# Patient Record
Sex: MALE | Race: WHITE | NOT HISPANIC OR LATINO | Employment: FULL TIME | URBAN - METROPOLITAN AREA
[De-identification: names, ages, dates, MRNs, and addresses within clinical notes are randomized per-mention and may not be internally consistent; named-entity substitution may affect disease eponyms.]

---

## 2018-12-25 ENCOUNTER — HOSPITAL ENCOUNTER (EMERGENCY)
Facility: CLINIC | Age: 38
Discharge: HOME | End: 2018-12-25
Attending: EMERGENCY MEDICINE | Admitting: EMERGENCY MEDICINE
Payer: COMMERCIAL

## 2018-12-25 VITALS
DIASTOLIC BLOOD PRESSURE: 65 MMHG | OXYGEN SATURATION: 97 % | SYSTOLIC BLOOD PRESSURE: 108 MMHG | TEMPERATURE: 97.5 F | HEART RATE: 74 BPM | RESPIRATION RATE: 16 BRPM | BODY MASS INDEX: 26.47 KG/M2 | WEIGHT: 169 LBS

## 2018-12-25 DIAGNOSIS — R21 PERIANAL RASH: ICD-10-CM

## 2018-12-25 LAB
ALBUMIN SERPL-MCNC: 3.8 G/DL (ref 3.4–5)
ALBUMIN UR-MCNC: NEGATIVE MG/DL
ALP SERPL-CCNC: 98 U/L (ref 40–150)
ALT SERPL W P-5'-P-CCNC: 19 U/L (ref 0–70)
ANION GAP SERPL CALCULATED.3IONS-SCNC: 10 MMOL/L (ref 3–14)
APPEARANCE UR: CLEAR
AST SERPL W P-5'-P-CCNC: 15 U/L (ref 0–45)
BASOPHILS # BLD AUTO: 0.1 10E9/L (ref 0–0.2)
BASOPHILS NFR BLD AUTO: 1.4 %
BILIRUB SERPL-MCNC: 0.4 MG/DL (ref 0.2–1.3)
BILIRUB UR QL STRIP: NEGATIVE
BUN SERPL-MCNC: 16 MG/DL (ref 7–30)
CALCIUM SERPL-MCNC: 8.3 MG/DL (ref 8.5–10.1)
CHLORIDE SERPL-SCNC: 101 MMOL/L (ref 94–109)
CO2 SERPL-SCNC: 27 MMOL/L (ref 20–32)
COLOR UR AUTO: YELLOW
CREAT SERPL-MCNC: 1 MG/DL (ref 0.66–1.25)
CRP SERPL-MCNC: 3.9 MG/L (ref 0–8)
DIFFERENTIAL METHOD BLD: ABNORMAL
EOSINOPHIL NFR BLD AUTO: 2.9 %
ERYTHROCYTE [DISTWIDTH] IN BLOOD BY AUTOMATED COUNT: 14.2 % (ref 10–15)
ERYTHROCYTE [SEDIMENTATION RATE] IN BLOOD BY WESTERGREN METHOD: 14 MM/H (ref 0–15)
GFR SERPL CREATININE-BSD FRML MDRD: >90 ML/MIN/{1.73_M2}
GLUCOSE SERPL-MCNC: 271 MG/DL (ref 70–99)
GLUCOSE UR STRIP-MCNC: >499 MG/DL
HCT VFR BLD AUTO: 39.8 % (ref 40–53)
HGB BLD-MCNC: 13.2 G/DL (ref 13.3–17.7)
HGB UR QL STRIP: ABNORMAL
IMM GRANULOCYTES # BLD: 0 10E9/L (ref 0–0.4)
IMM GRANULOCYTES NFR BLD: 0.2 %
KETONES UR STRIP-MCNC: NEGATIVE MG/DL
LEUKOCYTE ESTERASE UR QL STRIP: NEGATIVE
LYMPHOCYTES # BLD AUTO: 1.4 10E9/L (ref 0.8–5.3)
LYMPHOCYTES NFR BLD AUTO: 17.4 %
MCH RBC QN AUTO: 28.4 PG (ref 26.5–33)
MCHC RBC AUTO-ENTMCNC: 33.2 G/DL (ref 31.5–36.5)
MCV RBC AUTO: 86 FL (ref 78–100)
MONOCYTES # BLD AUTO: 1.1 10E9/L (ref 0–1.3)
MONOCYTES NFR BLD AUTO: 14.2 %
MUCOUS THREADS #/AREA URNS LPF: PRESENT /LPF
NEUTROPHILS # BLD AUTO: 5.1 10E9/L (ref 1.6–8.3)
NEUTROPHILS NFR BLD AUTO: 63.9 %
NITRATE UR QL: NEGATIVE
NRBC # BLD AUTO: 0 10*3/UL
NRBC BLD AUTO-RTO: 0 /100
PH UR STRIP: 5 PH (ref 5–7)
PLATELET # BLD AUTO: 285 10E9/L (ref 150–450)
POTASSIUM SERPL-SCNC: 4.6 MMOL/L (ref 3.4–5.3)
PROT SERPL-MCNC: 7.6 G/DL (ref 6.8–8.8)
RBC # BLD AUTO: 4.64 10E12/L (ref 4.4–5.9)
RBC #/AREA URNS AUTO: 7 /HPF (ref 0–2)
SODIUM SERPL-SCNC: 138 MMOL/L (ref 133–144)
SOURCE: ABNORMAL
SP GR UR STRIP: 1.01 (ref 1–1.03)
SPERM #/AREA URNS HPF: PRESENT /HPF
UROBILINOGEN UR STRIP-MCNC: 0 MG/DL (ref 0–2)
WBC # BLD AUTO: 8 10E9/L (ref 4–11)
WBC #/AREA URNS AUTO: 3 /HPF (ref 0–5)

## 2018-12-25 PROCEDURE — 25000128 H RX IP 250 OP 636: Performed by: EMERGENCY MEDICINE

## 2018-12-25 PROCEDURE — 85025 COMPLETE CBC W/AUTO DIFF WBC: CPT | Performed by: EMERGENCY MEDICINE

## 2018-12-25 PROCEDURE — 87077 CULTURE AEROBIC IDENTIFY: CPT | Performed by: EMERGENCY MEDICINE

## 2018-12-25 PROCEDURE — 99284 EMERGENCY DEPT VISIT MOD MDM: CPT | Mod: 25

## 2018-12-25 PROCEDURE — 96374 THER/PROPH/DIAG INJ IV PUSH: CPT

## 2018-12-25 PROCEDURE — 86140 C-REACTIVE PROTEIN: CPT | Performed by: EMERGENCY MEDICINE

## 2018-12-25 PROCEDURE — 87070 CULTURE OTHR SPECIMN AEROBIC: CPT | Performed by: EMERGENCY MEDICINE

## 2018-12-25 PROCEDURE — 85652 RBC SED RATE AUTOMATED: CPT | Performed by: EMERGENCY MEDICINE

## 2018-12-25 PROCEDURE — 87102 FUNGUS ISOLATION CULTURE: CPT | Performed by: EMERGENCY MEDICINE

## 2018-12-25 PROCEDURE — 87186 SC STD MICRODIL/AGAR DIL: CPT | Performed by: EMERGENCY MEDICINE

## 2018-12-25 PROCEDURE — 99283 EMERGENCY DEPT VISIT LOW MDM: CPT | Mod: Z6 | Performed by: EMERGENCY MEDICINE

## 2018-12-25 PROCEDURE — 80053 COMPREHEN METABOLIC PANEL: CPT | Performed by: EMERGENCY MEDICINE

## 2018-12-25 PROCEDURE — 25000125 ZZHC RX 250: Performed by: EMERGENCY MEDICINE

## 2018-12-25 PROCEDURE — 81001 URINALYSIS AUTO W/SCOPE: CPT | Performed by: EMERGENCY MEDICINE

## 2018-12-25 RX ORDER — DOXYCYCLINE 100 MG/1
100 CAPSULE ORAL 2 TIMES DAILY
Qty: 28 CAPSULE | Refills: 0 | Status: SHIPPED | OUTPATIENT
Start: 2018-12-25 | End: 2018-12-25

## 2018-12-25 RX ORDER — DOXYCYCLINE 100 MG/1
100 CAPSULE ORAL 2 TIMES DAILY
Qty: 14 CAPSULE | Refills: 0 | Status: SHIPPED | OUTPATIENT
Start: 2018-12-25 | End: 2019-01-01

## 2018-12-25 RX ORDER — MUPIROCIN 20 MG/G
OINTMENT TOPICAL 2 TIMES DAILY
Qty: 30 G | Refills: 0 | Status: SHIPPED | OUTPATIENT
Start: 2018-12-25 | End: 2019-01-08

## 2018-12-25 RX ORDER — HYDROMORPHONE HYDROCHLORIDE 1 MG/ML
0.5 INJECTION, SOLUTION INTRAMUSCULAR; INTRAVENOUS; SUBCUTANEOUS
Status: DISCONTINUED | OUTPATIENT
Start: 2018-12-25 | End: 2018-12-25 | Stop reason: HOSPADM

## 2018-12-25 RX ORDER — MUPIROCIN 20 MG/G
OINTMENT TOPICAL ONCE
Status: DISCONTINUED | OUTPATIENT
Start: 2018-12-25 | End: 2018-12-25 | Stop reason: HOSPADM

## 2018-12-25 RX ORDER — CLARITHROMYCIN 250 MG/1
1000 TABLET, FILM COATED ORAL ONCE
Status: DISCONTINUED | OUTPATIENT
Start: 2018-12-25 | End: 2018-12-25

## 2018-12-25 RX ADMIN — LIDOCAINE HYDROCHLORIDE 10 ML: 20 JELLY TOPICAL at 20:06

## 2018-12-25 RX ADMIN — Medication 0.5 MG: at 19:57

## 2018-12-25 NOTE — ED AVS SNAPSHOT
[facility] Emergency Department  [address] [place] [address]  Phone:  [phone]  Fax:  [phone]                                    Hung Fernandes   MRN: [id number]    Department:  [facility] Emergency Department   Date of Visit:  12/25/2018           After Visit Summary Signature Page    I have received my discharge instructions, and my questions have been answered. I have discussed any challenges I see with this plan with the nurse or doctor.    ..........................................................................................................................................  Patient/Patient Representative Signature      ..........................................................................................................................................  Patient Representative Print Name and Relationship to Patient    ..................................................               ................................................  Date                                   Time    ..........................................................................................................................................  Reviewed by Signature/Title    ...................................................              ..............................................  Date                                               Time          22EPIC Rev 08/18

## 2018-12-25 NOTE — LETTER
December 25, 2018      To Whom It May Concern:      Hung Fernandes was seen in our Emergency Department today, 12/25/18.  I expect his condition to improve over the next 2-3 days.  He may return to work/school when improved.    Sincerely,        Belkis Gannon MD

## 2018-12-26 NOTE — RESULT ENCOUNTER NOTE
[place] ED discharge antibiotic (if prescribed):  Doxycycline 100 mg PO tablet, 1 tablet (100 mg) by mouth 2 times daily for 7 days AND Bactroban oint  Incision and Drainage performed in [place] ED [Yes / No] : No  No changes in treatment per ED lab result protocol.

## 2018-12-26 NOTE — ED TRIAGE NOTES
"Here with rash to groin area. States he was seen in [place] in the past week and treated foe a yeast infection. \"What ever they gave me-it's not working\".  "

## 2018-12-26 NOTE — ED PROVIDER NOTES
"  History     Chief Complaint   Patient presents with     Rash     HPI  History per patient and medical records    This is a 38-year-old male with history of type 1 diabetes since age 6 presenting with rash.  Patient was at work 6 days ago when he had the sensation of some fluid leaking from his rectum.  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  After returning home, he noted that there was some clear wet fluid in his underwear and then started noting that he had pain in the rectal area \"like acid\".  He continued to have increasing pain and occasional fluid leaking from the rectum and eventually was seen in an urgent care Saturday, 3 days ago.  He was diagnosed with likely yeast infection and given some triamcinolone/nystatin cream and fluconazole orally.  Since that time, patient has continued to have symptoms that have worsened.  He notes that it hurts to sit, he has severe pain when trying to have a bowel movement.  He has had decreased oral intake because he does not want to try to pass stool.  He said for urinary frequency.  He denies any rectal bleeding.  No fevers or chills.  He is just felt generally unwell.  He was on antibiotics at the end of August for prostatitis for 6 weeks.  He notes that his rectal pain then was different.  It completely resolved after the antibiotics.  He has been trying some ibuprofen for pain without relief.  His blood sugars run high in the 200s and his A1c was 9 on last check.  Patient has history of some sort of breathing difficulty with low oxygen saturations.  He states he had a \"lump\" noted in his lung and had an attempted biopsy.  They were unable to biopsy the lump itself so he had a lymph node biopsied that did not show any abnormalities.  Patient's wife is concerned that he might have some kind of atypical infection such as blastomycosis because she researched this and noted it was associated with lung issues, skin issues, prostatitis.  " Patient has not had any travel history except to the [place].  No other skin lesions or complaints.    Problem List:    Patient Active Problem List    Diagnosis Date Noted     Type 1 diabetes mellitus (H)      Priority: Medium     Diabetes mellitus  Problem list name updated by automated process. Provider to review       Hypothyroid      Priority: Medium        Past Medical History:    Past Medical History:   Diagnosis Date     Closed fracture of unspecified part of tibia 04/2002     Hypothyroid      Proteinuria 03/2003     Type I (juvenile type) diabetes mellitus without mention of complication, not stated as uncontrolled        Past Surgical History:    Past Surgical History:   Procedure Laterality Date     C NONSPECIFIC PROCEDURE  04/02    right leg surgery melva placement     SURGICAL HISTORY OF -   07/02    Surgical revisions right leg     SURGICAL HISTORY OF -   10/02    Bone grafting - right leg       Family History:    Family History   Problem Relation Age of Onset     Alcohol/Drug Father         Alcoholism       Social History:  Marital Status:   [2]  Social History     Tobacco Use     Smoking status: Former Smoker     Packs/day: 1.00     Types: Cigarettes     Smokeless tobacco: Never Used   Substance Use Topics     Alcohol use: Yes     Comment: rare     Drug use: No        Medications:      doxycycline hyclate (VIBRAMYCIN) 100 MG capsule   lidocaine (XYLOCAINE) 2 % external gel   mupirocin (BACTROBAN) 2 % external ointment   INSULIN REGULAR HUMAN 100 UNIT/ML IJ SOLN   LANTUS 100 UNIT/ML SC SOLN   levothyroxine (SYNTHROID, LEVOTHROID) 75 MCG tablet         Review of Systems   All other ROS reviewed and are negative or non-contributory except as stated in HPI.     Physical Exam   BP: 116/59  Pulse: 74  Heart Rate: 89  Temp: 97.5  F (36.4  C)  Resp: 16  Weight: 76.7 kg (169 lb)  SpO2: 98 %      Physical Exam   Constitutional: He appears well-developed and well-nourished.   Small, thin,  uncomfortable appearing male standing in the room   HENT:   Head: Normocephalic.   Nose: Nose normal.   No intraoral lesions, no thrush   Eyes: Conjunctivae and EOM are normal. Pupils are equal, round, and reactive to light.   Neck: Normal range of motion. Neck supple.   Cardiovascular: Normal rate, regular rhythm and intact distal pulses.   Pulmonary/Chest: Effort normal.   Genitourinary:         Musculoskeletal: Normal range of motion. He exhibits no edema.   Neurological: He is alert. He exhibits normal muscle tone.   Skin: Skin is warm and dry. He is not diaphoretic.   Psychiatric: His behavior is normal.   Mildly anxious   Vitals reviewed.      ED Course (with Medical Decision Making)    Pt seen and examined by me.  RN and EPIC notes reviewed.      Patient with rash starting in his gluteal cleft and extending down to his perineum surrounding the rectum.  He apparently has had some clear fluid from his rectum.  He has tried an antifungal and anti-inflammatory cream with seemingly worsening symptoms.  I am unsure of what to make of the fluid from his rectum.  We talked about any sort of contact dermatitis causes such as new soaps, lotions, creams, washing detergents, or other exposures, new medications.  He denies knowing of any obvious cause.  I do not see any other lesions or satellite lesions.  My initial thought was a possible strep infection.  Posterior pharynx is normal.  I am mostly worried this may have some underlying bacterial cause since he did not respond at all to the topical antifungals and steroids but rather got worse.  I did use some lidocaine jelly which helps with the external pain.  First I also did a swab of some of the oozing area to send for bacterial culture and fungal culture.  IV was placed for pain medications, check labs.  We also checked his urine.    White count is normal.  Chemistry panel shows high glucose at 271.  ESR and CRP are normal.  He is dumping a lot of sugar in his  urine.    I am going to try some topical Bactroban over the area.  I also given some lidocaine jelly for home.  There is a chance this could be erythrasma.  The treatment for that is usually clarithromycin or erythromycin, but these are not available.  I did see evidence that doxycycline can help also.  I am going to give him an Rx for this for home.  This should hopefully also cover any other skin bacteria.  I would like him to make an appointment to see a primary care provider for follow-up as soon as possible.  If he is not improving however he can return to the ED promptly at any time.  We also discussed at length his need to keep his blood sugars in better control.        Procedures    Results for orders placed or performed during the hospital encounter of 12/25/18 (from the past 24 hour(s))   CBC with platelets differential   Result Value Ref Range    WBC 8.0 4.0 - 11.0 10e9/L    RBC Count 4.64 4.4 - 5.9 10e12/L    Hemoglobin 13.2 (L) 13.3 - 17.7 g/dL    Hematocrit 39.8 (L) 40.0 - 53.0 %    MCV 86 78 - 100 fl    MCH 28.4 26.5 - 33.0 pg    MCHC 33.2 31.5 - 36.5 g/dL    RDW 14.2 10.0 - 15.0 %    Platelet Count 285 150 - 450 10e9/L    Diff Method Automated Method     % Neutrophils 63.9 %    % Lymphocytes 17.4 %    % Monocytes 14.2 %    % Eosinophils 2.9 %    % Basophils 1.4 %    % Immature Granulocytes 0.2 %    Nucleated RBCs 0 0 /100    Absolute Neutrophil 5.1 1.6 - 8.3 10e9/L    Absolute Lymphocytes 1.4 0.8 - 5.3 10e9/L    Absolute Monocytes 1.1 0.0 - 1.3 10e9/L    Absolute Basophils 0.1 0.0 - 0.2 10e9/L    Abs Immature Granulocytes 0.0 0 - 0.4 10e9/L    Absolute Nucleated RBC 0.0    Comprehensive metabolic panel   Result Value Ref Range    Sodium 138 133 - 144 mmol/L    Potassium 4.6 3.4 - 5.3 mmol/L    Chloride 101 94 - 109 mmol/L    Carbon Dioxide 27 20 - 32 mmol/L    Anion Gap 10 3 - 14 mmol/L    Glucose 271 (H) 70 - 99 mg/dL    Urea Nitrogen 16 7 - 30 mg/dL    Creatinine 1.00 0.66 - 1.25 mg/dL    GFR  Estimate >90 >60 mL/min/[1.73_m2]    GFR Estimate If Black >90 >60 mL/min/[1.73_m2]    Calcium 8.3 (L) 8.5 - 10.1 mg/dL    Bilirubin Total 0.4 0.2 - 1.3 mg/dL    Albumin 3.8 3.4 - 5.0 g/dL    Protein Total 7.6 6.8 - 8.8 g/dL    Alkaline Phosphatase 98 40 - 150 U/L    ALT 19 0 - 70 U/L    AST 15 0 - 45 U/L   CRP inflammation   Result Value Ref Range    CRP Inflammation 3.9 0.0 - 8.0 mg/L   Erythrocyte sedimentation rate auto   Result Value Ref Range    Sed Rate 14 0 - 15 mm/h   UA with Microscopic   Result Value Ref Range    Color Urine Yellow     Appearance Urine Clear     Glucose Urine >499 (A) NEG^Negative mg/dL    Bilirubin Urine Negative NEG^Negative    Ketones Urine Negative NEG^Negative mg/dL    Specific Gravity Urine 1.014 1.003 - 1.035    Blood Urine Moderate (A) NEG^Negative    pH Urine 5.0 5.0 - 7.0 pH    Protein Albumin Urine Negative NEG^Negative mg/dL    Urobilinogen mg/dL 0.0 0.0 - 2.0 mg/dL    Nitrite Urine Negative NEG^Negative    Leukocyte Esterase Urine Negative NEG^Negative    Source Midstream Urine     WBC Urine 3 0 - 5 /HPF    RBC Urine 7 (H) 0 - 2 /HPF    Mucous Urine Present (A) NEG^Negative /LPF    sperm Present (A) NEG^Negative /HPF       Medications   lidocaine 2 % (URO-JET) jelly 10 mL (10 mLs Topical Given 12/25/18 2006)       Assessments & Plan      I have reviewed the findings, diagnosis, plan and need for follow up with the patient.       Medication List      Started    doxycycline hyclate 100 MG capsule  Commonly known as:  VIBRAMYCIN  100 mg, Oral, 2 TIMES DAILY     lidocaine 2 % external gel  Commonly known as:  XYLOCAINE  Topical, PRN     mupirocin 2 % external ointment  Commonly known as:  BACTROBAN  Topical, 2 TIMES DAILY            Final diagnoses:   Perianal rash     Disposition: Patient discharged home in stable condition.  Plan as above.  Return for concerns.     Note: Chart documentation done in part with Dragon Voice Recognition software. Although reviewed after completion,  some word and grammatical errors may remain.   12/25/2018   [facility] EMERGENCY DEPARTMENT     Belkis Gannon MD  12/26/18 005

## 2018-12-26 NOTE — DISCHARGE INSTRUCTIONS
Try a sitz bath as needed for discomfort.    Use Tucks pads or sensitive wipes as needed for cleaning.    Apply lidocaine jelly as needed for pain.    Antibiotic ointment twice daily to red area.    Antibiotics as prescribed.    Follow-up in clinic for recheck later this week.    Return at any time for concerns.    I hope this improves very quickly!!    Neela Parmar!!

## 2018-12-28 LAB
BACTERIA SPEC CULT: ABNORMAL
BACTERIA SPEC CULT: ABNORMAL
SPECIMEN SOURCE: ABNORMAL

## 2018-12-28 NOTE — RESULT ENCOUNTER NOTE
Await final culture report per [facility] ED Lab Result protocol.  RN confirmed Patient was prescribed antibiotic from ED visit.

## 2018-12-29 ENCOUNTER — TELEPHONE (OUTPATIENT)
Dept: EMERGENCY MEDICINE | Facility: CLINIC | Age: 38
End: 2018-12-29

## 2018-12-29 NOTE — TELEPHONE ENCOUNTER
"[facility] Emergency Department Lab result notification:    [facility] ED lab result protocol used  General culture    Reason for call  Notify of lab results, assess symptoms,  review ED providers recommendations/discharge instructions (if necessary) and advise per ED lab result f/u protocol    Lab Result  Final Wound culture (perianal rash) report on 12/28/18  Emergency Dept discharge antibiotic prescribed: Doxycycline 100 mg PO tablet, 1 tablet (100 mg) by mouth 2 times daily for 7 days AND Bactroban oint  #1. Bacteria, Heavy growth Streptococcus pyrogenes sero group A, which is [NOT TESTED] to antibiotic   #2. Bacteria, Heavy  growth Staphylococcus aureus, which is [RESISTANT] to antibiotic   Incision and Drainage performed in [facility] ED [Yes / No]: No  Patient to be notified of result, symptoms assessed and advised per [facility] ED lab result protocol.    Information table from ED Provider visit on 12/25/18  Symptoms reported at ED visit (Chief complaint, HPI) Rash      HPI  History per patient and medical records     This is a 38-year-old male with history of type 1 diabetes since age 6 presenting with rash.  Patient was at work 6 days ago when he had the sensation of some fluid leaking from his rectum.  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  After returning home, he noted that there was some clear wet fluid in his underwear and then started noting that he had pain in the rectal area \"like acid\".  He continued to have increasing pain and occasional fluid leaking from the rectum and eventually was seen in an urgent care Saturday, 3 days ago.  He was diagnosed with likely yeast infection and given some triamcinolone/nystatin cream and fluconazole orally.  Since that time, patient has continued to have symptoms that have worsened.  He notes that it hurts to sit, he has severe pain when trying to have a bowel movement.  He has had decreased oral intake because he " "does not want to try to pass stool.  He said for urinary frequency.  He denies any rectal bleeding.  No fevers or chills.  He is just felt generally unwell.  He was on antibiotics at the end of August for prostatitis for 6 weeks.  He notes that his rectal pain then was different.  It completely resolved after the antibiotics.  He has been trying some ibuprofen for pain without relief.  His blood sugars run high in the 200s and his A1c was 9 on last check.  Patient has history of some sort of breathing difficulty with low oxygen saturations.  He states he had a \"lump\" noted in his lung and had an attempted biopsy.  They were unable to biopsy the lump itself so he had a lymph node biopsied that did not show any abnormalities.  Patient's wife is concerned that he might have some kind of atypical infection such as blastomycosis because she researched this and noted it was associated with lung issues, skin issues, prostatitis.  Patient has not had any travel history except to the [facility].  No other skin lesions or complaints.     ED providers Impression and Plan (applicable information)    Patient with rash starting in his gluteal cleft and extending down to his perineum surrounding the rectum.  He apparently has had some clear fluid from his rectum.  He has tried an antifungal and anti-inflammatory cream with seemingly worsening symptoms.  I am unsure of what to make of the fluid from his rectum.  We talked about any sort of contact dermatitis causes such as new soaps, lotions, creams, washing detergents, or other exposures, new medications.  He denies knowing of any obvious cause.  I do not see any other lesions or satellite lesions.  My initial thought was a possible strep infection.  Posterior pharynx is normal.  I am mostly worried this may have some underlying bacterial cause since he did not respond at all to the topical antifungals and steroids but rather got worse.  I did use some lidocaine jelly which " "helps with the external pain.  First I also did a swab of some of the oozing area to send for bacterial culture and fungal culture.  IV was placed for pain medications, check labs.  We also checked his urine.     White count is normal.  Chemistry panel shows high glucose at 271.  ESR and CRP are normal.  He is dumping a lot of sugar in his urine.     I am going to try some topical Bactroban over the area.  I also given some lidocaine jelly for home.  There is a chance this could be erythrasma.  The treatment for that is usually clarithromycin or erythromycin, but these are not available.  I did see evidence that doxycycline can help also.  I am going to give him an Rx for this for home.  This should hopefully also cover any other skin bacteria.  I would like him to make an appointment to see a primary care provider for follow-up as soon as possible.  If he is not improving however he can return to the ED promptly at any time.  We also discussed at length his need to keep his blood sugars in better control.   Miscellaneous information N/A     RN Assessment (Patient s current Symptoms), include time called.  [Insert Left message here if message left]  4:47PM: Spoke with Patient. States he is doing better. \"I could hardly walk before it hurt so much.\" Is able to ambulate without difficulty now. States the rectal area discomfort is greatly improved. Rash surrounding the rectal area is \"just a tiny bit\" there presently, \"it was extremely red before.\" Denies fever, chills. Is eating and drinking well.   RN Recommendations/Instructions per [place] ED lab result protocol  Patient notified of lab result. Advised to continue taking the prescribed antibiotic. Has not made a follow up appointment with his PCP, advised to see PCP upon completion of the antibiotic. Reviewed signs of worsening symptoms. Patient has no further concerns at this time. Yeast culture is still pending, Patient would like to be notified of this result " when finalized.     Please Contact your PCP clinic or return to the Emergency department if your:    Symptoms worsen or other concerning symptom's.    PCP follow-up Questions asked: YES       [RN Name]  Sylvia Garduno RN  [facility] RN  Lung Nodule and ED Lab Result RN  Epic pool (ED late result f/u RN): [id number]  [facility] INCIDENTAL RADIOLOGY F/U NURSES: [id number]  # [phone]      Copy of Lab result   Skin Culture Aerobic Bacterial [[id number]] (Order [id number])   Exam Information     Exam Date Exam Time Accession # Results    12/25/18  8:02 PM [id number]    Component Results     Specimen Information: Perirectal; Skin        Component Collected Lab   Specimen Description 12/25/2018  8:02    Skin Perirectal    Culture Micro (Abnormal) 12/25/2018  8:02    Abnormal   Heavy growth   Streptococcus pyogenes sero group A   Susceptibility testing not routinely done     Culture Micro (Abnormal) 12/25/2018  8:02    Abnormal   Heavy growth   Staphylococcus aureus     Susceptibility     Staphylococcus aureus (2)     Antibiotic Interpretation Sensitivity Method Status   CLINDAMYCIN Sensitive <=0.25 ug/mL ELVIRA Final   ERYTHROMYCIN Sensitive <=0.25 ug/mL ELVIRA Final   GENTAMICIN Sensitive <=0.5 ug/mL ELVIRA Final   OXACILLIN Sensitive 0.5 ug/mL ELVIRA Final   PENICILLIN Resistant >=0.5 ug/mL ELVIRA Final   TETRACYCLINE Resistant >=16 ug/mL ELVIRA Final   Trimethoprim/Sulfa Sensitive <=0.5/9.5 ug/mL ELVIRA Final   VANCOMYCIN Sensitive 1 ug/mL ELVIRA Final

## 2018-12-31 LAB
SPECIMEN SOURCE: NORMAL
YEAST SPEC QL CULT: NORMAL

## 2018-12-31 NOTE — TELEPHONE ENCOUNTER
"Notified of final yeast culture showing \"No growth\".  Reports symptoms continue to improve, \"not painful anymore and is not feeling hot.\"  He will be trying to f/u with his PCP this week.    Scott Mahoney RN  [facility] RN  Lung Nodule and ED Lab Result RN  Epic pool (ED late result f/u RN): [id number]  [facility] INCIDENTAL RADIOLOGY F/U NURSES: [id number]  # [phone]    "

## 2019-11-04 ENCOUNTER — HEALTH MAINTENANCE LETTER (OUTPATIENT)
Age: 39
End: 2019-11-04

## 2020-03-15 ENCOUNTER — VIRTUAL VISIT (OUTPATIENT)
Dept: FAMILY MEDICINE | Facility: OTHER | Age: 40
End: 2020-03-15

## 2020-03-21 NOTE — PROGRESS NOTES
"Date: 03/15/2020 16:21:56  Clinician: Dr. Keiko Becker, BRIANNA, SAM  Clinician NPI: [id number]  Patient: Hung Fernandes  Patient : 1980  Patient Address: [address] [place], [place] [address]  Patient Phone: [phone]  Visit Protocol: URI  Patient Summary:  Hung is a 40 year old ( : 1980 ) male who initiated a Visit for COVID-19 (Coronavirus) evaluation and screening. When asked the question \"Please sign me up to receive news, health information and promotions. \", Hung responded \"No\".    Hung states his symptoms started suddenly 7-9 days ago.   His symptoms consist of malaise, chills, wheezing, and a cough. Hung also feels feverish.   Symptom details     Cough: Hung coughs almost every minute and his cough is more bothersome at night. Phlegm does not come into his throat when he coughs. He does not believe his cough is caused by post-nasal drip.     Temperature: His current temperature is 98.3 degrees Fahrenheit.     Wheezing: Hung has been diagnosed with asthma. The wheezing interferes with his normal daily activities.     Hung denies having ear pain, headache, rhinitis, facial pain or pressure, myalgias, sore throat, nasal congestion, and teeth pain. He also denies having recent facial or sinus surgery in the past 60 days, double sickening (worsening symptoms after initial improvement), and taking antibiotic medication for the symptoms.   Precipitating events  He has not recently been exposed to someone with influenza. Hung has not been in close contact with any high risk individuals.   Pertinent COVID-19 (Coronavirus) information  Hung has not traveled internationally or to the areas where COVID-19 (Coronavirus) is widespread in the last 14 days before the start of his symptoms.   Hung has not had close contact with a suspected or laboratory-confirmed COVID-19 patient within 14 days of symptom onset.   Hung is not a healthcare worker and does not work in a healthcare " facility.   Triage Point(s) temporarily suspended for COVID-19 (Coronavirus) screening  Hung reported the following symptoms which were previously protocol referral points. These protocol referral points have temporarily been removed for purposes of COVID-19 (Coronavirus) screening.     Difficulty breathing even when resting and can only speak in phrase(s)    Wheezing that keeps Hung from doing daily activities     Pertinent medical history  Hung does not need a return to work/school note.   Weight: 165 lbs   Hung does not smoke or use smokeless tobacco.   Additional information as reported by the patient (free text): Type 1 diabetic   Weight: 165 lbs    MEDICATIONS: Novolog Flexpen U-100 Insulin aspart subcutaneous, insulin glargine subcutaneous, ALLERGIES: NKDA  Clinician Response:  Dear Hung,  Based on the information provided, you have an influenza-like illness. This is an infection that has the same symptoms of the flu, but the specific virus is not known. Lab testing would be required to confirm the flu virus, but this is often not necessary because the treatment will be the same no matter what is causing your symptoms.  Your symptoms should improve gradually over the next week.  Medication information  I am prescribing:       Benzonatate (Tessalon Perles) 100 mg oral capsule. Take 1-2 capsules by mouth 3 times per day as needed for your cough. There are no refills with this prescription.      Ventolin HFA 90 mcg/actuation aerosol inhaler. Inhale 2 puffs every 4-6 hours as needed for 5 days. There are no refills with this prescription.     The CDC recommends treatment for flu only if antiviral medications can be started within 48 hours of the first flu symptoms or if you fall into one of the high-risk groups that are more likely to get flu complications.  Since you do not meet guidelines for treatment with antiviral medications, antiviral treatment is not recommended for you. Treatment focuses on  controlling your symptoms.  Unless you are allergic to the over-the-counter medication(s) below, I recommend using:       Acetaminophen (Tylenol or store brand) oral tablet. Take 1-2 tablets by mouth every 4-6 hours to help with the discomfort.      Ibuprofen (Advil or store brand) 200 mg oral tablet. Take 1-3 tablets (200-600 mg) by mouth every 8 hours to help with the discomfort. Make sure to take the ibuprofen with food. Do not exceed 2400 mg in 24 hours.     Over-the-counter medications do not require a prescription. Ask the pharmacist if you have any questions.  Self care  The following tips will keep you as comfortable as possible while you recover:     Rest    Drink plenty of water and other liquids    Take a spoonful of honey to reduce your cough     If you have a fever, stay home until your temperature has returned to normal for 24 hours and you feel well enough for daily activities. And of course, wash your hands often to prevent spreading the flu and other illnesses. However, the best way to prevent the flu is to get a flu shot before each flu season.  When to seek care  Please be seen in a clinic or urgent care if new symptoms develop, or symptoms become worse.  COVID-19 (Coronavirus) General Information  With the increase in the number of COVID-19 (Coronavirus) cases, we understand you may have some questions. Below is some helpful information on COVID-19 (Coronavirus).  How can I protect myself and others from the COVID-19 (Coronavirus)?  Because there is currently no vaccine to prevent infection, the best way to protect yourself is to avoid being exposed to this virus. Put distance between yourself and other people if COVID-19 (Coronavirus) is spreading in your community. The virus is thought to spread mainly from person-to-person.     Between people who are in close contact with one another (within about 6 about) for prolonged period (10 minutes or longer).    Through respiratory droplets produced  when an infected person coughs or sneezes.     The CDC recommends the following additional steps to protect yourself and others:     Wash your hands often with soap and water for at least 20 seconds, especially after blowing your nose, coughing, or sneezing; going to the bathroom; and before eating or preparing food.  Use an alcohol-based hand  that contains at least 60 percent alcohol if soap and water are not available.        Avoid touching your eyes, nose and mouth with unwashed hands.    Avoid close contact with people who are sick.    Stay home when you are sick.    Cover your cough or sneeze with a tissue, then throw the tissue in the trash.    Clean and disinfect frequently touched objects and surfaces.     You can help stop COVID-19 (Coronavirus) by knowing the signs and symptoms:     Fever    Cough    Shortness of breath     Contact your healthcare provider if   Develop symptoms   AND   Have been in close contact with a person known to have COVID-19 (Coronavirus) or live in or have recently traveled from an area with ongoing spread of COVID-19 (Coronavirus). Call ahead before you go to a doctor's office or emergency room. Tell them about your recent travel and your symptoms.   For the most up to date information, visit the CDC's website.  Steps to help prevent the spread of COVID-19 (Coronavirus) if you are sick  If you are sick with COVID-19 (Coronavirus) or suspect you are infected with the virus that causes COVID-19 (Coronavirus), follow the steps below to help prevent the disease from spreading&nbsp;to people in your home and community.     Stay home except to get medical care. Home isolation may be started in consultation with your healthcare clinician.    Separate yourself from other people and animals in your home.    Call ahead before visiting your doctor if you have a medical appointment.    Wear a facemask when you are around other people.    Cover your cough and sneezes.    Clean your  "hands often.    Avoid sharing personal household items.    Clean and disinfect frequently touched objects and surfaces everyday.    You will need to have someone drop off medications or household supplies (if needed) at your house without coming inside or in contact with you or others living in your house.    Monitor your symptoms and seek prompt medical care if your illness is worsening (e.g. Difficulty breathing).    Discontinue home isolation only in consultation with your healthcare provider.     For more detailed and up to date information on what to do if you are sick, visit this link: What to Do If You Are Sick With Coronavirus Disease 2019 (COVID-19).  Do I need to be tested for COVID-19 (Coronavirus)?     At this time, the limited number of tests available are controlled by the state and local health departments and are being reserved for more seriously ill patients, those with known exposure to confirmed patients, and those with recent travel (within 14 days) to countries with high rates of COVID-19 (Coronavirus).    Decisions on which patients receive testing will be based on the local spread of COVID-19 (Coronavirus) as well as the symptoms. Your healthcare provider will make the final decision on whether you should be tested.    In the meantime, if you have concerns that you may have been exposed, it is reasonable to practice \"social distancing.\"&nbsp; If you are ill with a cold or flu-like illness, please monitor your symptoms and reach out to your healthcare provider if your symptoms worsen.    For more up to date information, visit this link: COVID-19 (Coronavirus) Frequently Asked Questions and Answers.      Diagnosis: Influenza-like illness  Diagnosis ICD: J11.1  Prescription: Ventolin HFA 90 mcg/actuation inhalation HFA aerosol inhaler 1 200 inhalation canister, 5 days supply. Inhale 2 puffs every 4-6 hours as needed for 5 days. Refills: 0, Refill as needed: no, Allow substitutions: " yes  Prescription: benzonatate (Tessalon Perles) 100 mg oral capsule 30 capsule, 5 days supply. Take 1-2 capsules by mouth 3 times per day as needed. Refills: 0, Refill as needed: no, Allow substitutions: yes  Pharmacy: [id number] - [phone] - [address] [phone]

## 2020-11-16 ENCOUNTER — HEALTH MAINTENANCE LETTER (OUTPATIENT)
Age: 40
End: 2020-11-16

## 2021-05-29 ENCOUNTER — HEALTH MAINTENANCE LETTER (OUTPATIENT)
Age: 41
End: 2021-05-29

## 2021-09-18 ENCOUNTER — HEALTH MAINTENANCE LETTER (OUTPATIENT)
Age: 41
End: 2021-09-18

## 2022-01-08 ENCOUNTER — HEALTH MAINTENANCE LETTER (OUTPATIENT)
Age: 42
End: 2022-01-08

## 2022-08-20 ENCOUNTER — HEALTH MAINTENANCE LETTER (OUTPATIENT)
Age: 42
End: 2022-08-20

## 2022-11-20 ENCOUNTER — HEALTH MAINTENANCE LETTER (OUTPATIENT)
Age: 42
End: 2022-11-20

## 2023-04-15 ENCOUNTER — HEALTH MAINTENANCE LETTER (OUTPATIENT)
Age: 43
End: 2023-04-15

## 2023-11-19 ENCOUNTER — HEALTH MAINTENANCE LETTER (OUTPATIENT)
Age: 43
End: 2023-11-19

## 2024-03-28 ENCOUNTER — OFFICE VISIT (OUTPATIENT)
Dept: FAMILY MEDICINE | Facility: OTHER | Age: 44
End: 2024-03-28
Payer: COMMERCIAL

## 2024-03-28 VITALS
HEIGHT: 67 IN | OXYGEN SATURATION: 95 % | WEIGHT: 159.5 LBS | TEMPERATURE: 97.1 F | RESPIRATION RATE: 14 BRPM | BODY MASS INDEX: 25.03 KG/M2 | DIASTOLIC BLOOD PRESSURE: 70 MMHG | HEART RATE: 88 BPM | SYSTOLIC BLOOD PRESSURE: 100 MMHG

## 2024-03-28 DIAGNOSIS — Z00.00 ROUTINE GENERAL MEDICAL EXAMINATION AT A HEALTH CARE FACILITY: Primary | ICD-10-CM

## 2024-03-28 DIAGNOSIS — E10.9 TYPE 1 DIABETES MELLITUS WITHOUT COMPLICATION (H): Primary | ICD-10-CM

## 2024-03-28 DIAGNOSIS — E10.9 TYPE 1 DIABETES MELLITUS WITHOUT COMPLICATION (H): ICD-10-CM

## 2024-03-28 DIAGNOSIS — E10.65 TYPE 1 DIABETES MELLITUS WITH HYPERGLYCEMIA (H): ICD-10-CM

## 2024-03-28 DIAGNOSIS — E03.9 HYPOTHYROIDISM, UNSPECIFIED TYPE: ICD-10-CM

## 2024-03-28 PROBLEM — Z79.4 LONG TERM CURRENT USE OF INSULIN (H): Status: ACTIVE | Noted: 2024-03-28

## 2024-03-28 PROBLEM — J45.20 MILD INTERMITTENT ASTHMA WITHOUT COMPLICATION: Status: ACTIVE | Noted: 2018-08-01

## 2024-03-28 LAB
ANION GAP SERPL CALCULATED.3IONS-SCNC: 13 MMOL/L (ref 7–15)
BASOPHILS # BLD AUTO: 0.1 10E3/UL (ref 0–0.2)
BASOPHILS NFR BLD AUTO: 1 %
BUN SERPL-MCNC: 19 MG/DL (ref 6–20)
CALCIUM SERPL-MCNC: 9.4 MG/DL (ref 8.6–10)
CHLORIDE SERPL-SCNC: 97 MMOL/L (ref 98–107)
CHOLEST SERPL-MCNC: 157 MG/DL
CREAT SERPL-MCNC: 0.87 MG/DL (ref 0.67–1.17)
CREAT UR-MCNC: 34.6 MG/DL
DEPRECATED HCO3 PLAS-SCNC: 22 MMOL/L (ref 22–29)
EGFRCR SERPLBLD CKD-EPI 2021: >90 ML/MIN/1.73M2
EOSINOPHIL # BLD AUTO: 0.2 10E3/UL (ref 0–0.7)
EOSINOPHIL NFR BLD AUTO: 2 %
ERYTHROCYTE [DISTWIDTH] IN BLOOD BY AUTOMATED COUNT: 13 % (ref 10–15)
FASTING STATUS PATIENT QL REPORTED: NO
GLUCOSE SERPL-MCNC: 484 MG/DL (ref 70–99)
HBA1C MFR BLD: 13.4 % (ref 0–5.6)
HCT VFR BLD AUTO: 43.9 % (ref 40–53)
HDLC SERPL-MCNC: 31 MG/DL
HGB BLD-MCNC: 14.8 G/DL (ref 13.3–17.7)
IMM GRANULOCYTES # BLD: 0 10E3/UL
IMM GRANULOCYTES NFR BLD: 0 %
LDLC SERPL CALC-MCNC: 63 MG/DL
LYMPHOCYTES # BLD AUTO: 1.4 10E3/UL (ref 0.8–5.3)
LYMPHOCYTES NFR BLD AUTO: 17 %
MCH RBC QN AUTO: 29.3 PG (ref 26.5–33)
MCHC RBC AUTO-ENTMCNC: 33.7 G/DL (ref 31.5–36.5)
MCV RBC AUTO: 87 FL (ref 78–100)
MICROALBUMIN UR-MCNC: <12 MG/L
MICROALBUMIN/CREAT UR: NORMAL MG/G{CREAT}
MONOCYTES # BLD AUTO: 0.8 10E3/UL (ref 0–1.3)
MONOCYTES NFR BLD AUTO: 10 %
NEUTROPHILS # BLD AUTO: 5.6 10E3/UL (ref 1.6–8.3)
NEUTROPHILS NFR BLD AUTO: 69 %
NONHDLC SERPL-MCNC: 126 MG/DL
PLATELET # BLD AUTO: 271 10E3/UL (ref 150–450)
POTASSIUM SERPL-SCNC: 4.8 MMOL/L (ref 3.4–5.3)
RBC # BLD AUTO: 5.05 10E6/UL (ref 4.4–5.9)
SODIUM SERPL-SCNC: 132 MMOL/L (ref 135–145)
T4 FREE SERPL-MCNC: 1.09 NG/DL (ref 0.9–1.7)
TRIGL SERPL-MCNC: 314 MG/DL
TSH SERPL DL<=0.005 MIU/L-ACNC: 6.56 UIU/ML (ref 0.3–4.2)
WBC # BLD AUTO: 8 10E3/UL (ref 4–11)

## 2024-03-28 PROCEDURE — 85025 COMPLETE CBC W/AUTO DIFF WBC: CPT | Performed by: PHYSICIAN ASSISTANT

## 2024-03-28 PROCEDURE — 99207 PR FOOT EXAM NO CHARGE: CPT | Performed by: PHYSICIAN ASSISTANT

## 2024-03-28 PROCEDURE — 84443 ASSAY THYROID STIM HORMONE: CPT | Performed by: PHYSICIAN ASSISTANT

## 2024-03-28 PROCEDURE — 82570 ASSAY OF URINE CREATININE: CPT | Performed by: PHYSICIAN ASSISTANT

## 2024-03-28 PROCEDURE — 83036 HEMOGLOBIN GLYCOSYLATED A1C: CPT | Performed by: PHYSICIAN ASSISTANT

## 2024-03-28 PROCEDURE — 36415 COLL VENOUS BLD VENIPUNCTURE: CPT | Performed by: PHYSICIAN ASSISTANT

## 2024-03-28 PROCEDURE — 80048 BASIC METABOLIC PNL TOTAL CA: CPT | Performed by: PHYSICIAN ASSISTANT

## 2024-03-28 PROCEDURE — 84439 ASSAY OF FREE THYROXINE: CPT | Performed by: PHYSICIAN ASSISTANT

## 2024-03-28 PROCEDURE — 99386 PREV VISIT NEW AGE 40-64: CPT | Performed by: PHYSICIAN ASSISTANT

## 2024-03-28 PROCEDURE — 82043 UR ALBUMIN QUANTITATIVE: CPT | Performed by: PHYSICIAN ASSISTANT

## 2024-03-28 PROCEDURE — 80061 LIPID PANEL: CPT | Performed by: PHYSICIAN ASSISTANT

## 2024-03-28 RX ORDER — INSULIN GLARGINE 100 [IU]/ML
20 INJECTION, SOLUTION SUBCUTANEOUS EVERY MORNING
COMMUNITY
Start: 2024-03-28 | End: 2024-03-28

## 2024-03-28 RX ORDER — INSULIN GLARGINE 100 [IU]/ML
INJECTION, SOLUTION SUBCUTANEOUS
Qty: 15 ML | Refills: 0 | Status: SHIPPED | OUTPATIENT
Start: 2024-03-28 | End: 2024-05-20

## 2024-03-28 RX ORDER — LEVOTHYROXINE SODIUM 75 UG/1
75 TABLET ORAL DAILY
Qty: 30 TABLET | Refills: 1 | Status: SHIPPED | OUTPATIENT
Start: 2024-03-28 | End: 2024-05-28

## 2024-03-28 SDOH — HEALTH STABILITY: PHYSICAL HEALTH: ON AVERAGE, HOW MANY MINUTES DO YOU ENGAGE IN EXERCISE AT THIS LEVEL?: 120 MIN

## 2024-03-28 SDOH — HEALTH STABILITY: PHYSICAL HEALTH: ON AVERAGE, HOW MANY DAYS PER WEEK DO YOU ENGAGE IN MODERATE TO STRENUOUS EXERCISE (LIKE A BRISK WALK)?: 5 DAYS

## 2024-03-28 ASSESSMENT — SOCIAL DETERMINANTS OF HEALTH (SDOH): HOW OFTEN DO YOU GET TOGETHER WITH FRIENDS OR RELATIVES?: ONCE A WEEK

## 2024-03-28 ASSESSMENT — PAIN SCALES - GENERAL: PAINLEVEL: NO PAIN (0)

## 2024-03-28 NOTE — PROGRESS NOTES
Preventive Care Visit  [facility]  Rafael Arredondo PA-C, Family Medicine  Mar 28, 2024      Assessment & Plan     Routine general medical examination at a health care facility  Patient is a 43 year old male who presents today for annual checkup. He was previously receiving care with [facility] and is planning to transition his care to [facility]. He has scheduled endocrinology appointment in May 2024. Patient informs me that he works in [employer] which is rather physically demanding. He says that he does not obtain much exercise outside of work. He does enjoy camping over the summers and just reserved a permanent site in [place], [place]. Reviewed healthy lifestyle recommendations with the patient. Reviewed health maintenance and updated per the patient's preferences.  - Basic metabolic panel  (Ca, Cl, CO2, Creat, Gluc, K, Na, BUN); Future  - Hemoglobin A1c; Future  - Albumin Random Urine Quantitative with Creat Ratio; Future  - CBC with platelets and differential; Future    Type 1 diabetes mellitus without complication (H)  Patient has been managing his diabetes with long acting and short acting insulin. Updated medication list to reflect his current use. Previous A1cs in health history are from 2021 and out of date. I will update this today.   - Lipid panel reflex to direct LDL Non-fasting; Future  - FOOT EXAM  - Hemoglobin A1c; Future  - Albumin Random Urine Quantitative with Creat Ratio; Future    Hypothyroidism, unspecified type  Patient informs me that he has not been taking his levothyroxine for some time. He also reports that he has been feeling more fatigued by the end of the day and does not feel he has energy to do much in the evenings. I will update the thyroid today. Pending results can restart the levothyroxine.   - TSH WITH FREE T4 REFLEX; Future    Counseling  Appropriate preventive services were discussed with this patient, including applicable screening as appropriate for fall  prevention, nutrition, physical activity, Tobacco-use cessation, weight loss and cognition.  Checklist reviewing preventive services available has been given to the patient.  Reviewed patient's diet, addressing concerns and/or questions.     Vickey Persaud is a 43 year old, presenting for the following:  Physical        3/28/2024     7:30 AM   Additional Questions   Roomed by grant VAUGHAN   Accompanied by Self     Health Care Directive  Patient does not have a Health Care Directive or Living Will: Discussed advance care planning with patient; information given to patient to review.    HPI        3/28/2024   General Health   How would you rate your overall physical health? Good   Feel stress (tense, anxious, or unable to sleep) Not at all         3/28/2024   Nutrition   Three or more servings of calcium each day? Yes   Diet: Diabetic   How many servings of fruit and vegetables per day? (!) 0-1   How many sweetened beverages each day? 0-1         3/28/2024   Exercise   Days per week of moderate/strenous exercise 5 days   Average minutes spent exercising at this level 120 min         3/28/2024   Social Factors   Frequency of gathering with friends or relatives Once a week   Worry food won't last until get money to buy more No   Food not last or not have enough money for food? No   Do you have housing?  No   Are you worried about losing your housing? No   Lack of transportation? No   Unable to get utilities (heat,electricity)? No   Want help with housing or utility concern? No   (!) HOUSING CONCERN PRESENT      3/28/2024   Dental   Dentist two times every year? Yes         3/28/2024   TB Screening   Were you born outside of the [place]? No     Today's PHQ-2 Score:       3/28/2024     7:25 AM   PHQ-2 ( 1999 Pfizer)   Q1: Little interest or pleasure in doing things 0   Q2: Feeling down, depressed or hopeless 0   PHQ-2 Score 0   Q1: Little interest or pleasure in doing things Not at all   Q2: Feeling down, depressed or  "hopeless Not at all   PHQ-2 Score 0         3/28/2024   Substance Use   Alcohol more than 3/day or more than 7/wk No   Do you use any other substances recreationally? No     Social History     Tobacco Use    Smoking status: Former     Packs/day: 1     Types: Cigarettes    Smokeless tobacco: Never   Vaping Use    Vaping Use: Never used   Substance Use Topics    Alcohol use: Yes     Comment: rare    Drug use: No         3/28/2024   One time HIV Screening   Previous HIV test? No         3/28/2024   STI Screening   New sexual partner(s) since last STI/HIV test? No   ASCVD Risk   The ASCVD Risk score (Dany CALIX, et al., 2019) failed to calculate for the following reasons:    Cannot find a previous HDL lab    Cannot find a previous total cholesterol lab        3/28/2024   Contraception/Family Planning   Questions about contraception or family planning No     Reviewed and updated as needed this visit by Provider      Review of Systems  Constitutional, HEENT, cardiovascular, pulmonary, gi and gu systems are negative, except as otherwise noted.     Objective    Exam  /70   Pulse 88   Temp 97.1  F (36.2  C) (Temporal)   Resp 14   Ht 1.702 m (5' 7\")   Wt 72.3 kg (159 lb 8 oz)   SpO2 95%   BMI 24.98 kg/m     Estimated body mass index is 24.98 kg/m  as calculated from the following:    Height as of this encounter: 1.702 m (5' 7\").    Weight as of this encounter: 72.3 kg (159 lb 8 oz).    Physical Exam  GENERAL: alert and no distress  EYES: Eyes grossly normal to inspection, PERRL and conjunctivae and sclerae normal  HENT: ear canals and TM's normal, nose and mouth without ulcers or lesions  NECK: no adenopathy, no asymmetry, masses, or scars  RESP: lungs clear to auscultation - no rales, rhonchi or wheezes  CV: regular rate and rhythm, normal S1 S2, no S3 or S4, no murmur, click or rub, no peripheral edema  ABDOMEN: soft, nontender, no hepatosplenomegaly, no masses and bowel sounds normal  MS: no gross " musculoskeletal defects noted, no edema  NEURO: Normal strength and tone, mentation intact and speech normal  PSYCH: mentation appears normal, affect normal/bright        Signed Electronically by: Rafael Arredondo PA-C

## 2024-03-28 NOTE — COMMUNITY RESOURCES LIST (ENGLISH)
March 28, 2024           YOUR PERSONALIZED LIST OF SERVICES & PROGRAMS           & SHELTER    Housing      [facility] - Coordinated Entry access point  [address] [place] [address] (Distance: 20.4 miles)  Phone: [phone]  Website: [email or web address]  Language: English  Fee: Free      [facility]  Phone: [phone]  Website: https://www.N-1-1.org/  Language: English      [facility] - Housing Stabilization Services  Phone: [phone]  Website: https://"[email or web address]"/Housing-Stabilization.html  Language: English  Hours: Mon 9:00 AM - 5:00 PM Tue 9:00 AM - 5:00 PM Wed 9:00 AM - 5:00 PM Thu 9:00 AM - 5:00 PM Fri 9:00 AM - 5:00 PM  Fee: Insurance  Accessibility: Deaf or hard of hearing, Translation services    Case Management      [facility] search assistance  [address] [place] [address] (Distance: 11.1 miles)  Phone: [phone]  Language: English  Fee: Free  Accessibility: [place] accessible, Translation services      [employer] - Housing Stabilization Services  Phone: [phone]  Website: https://homebasemn.com/  Language: English  Hours: Mon 8:00 AM - 4:00 PM Tue 8:00 AM - 4:00 PM Wed 8:00 AM - 4:00 PM Thu 8:00 AM - 4:00 PM Fri 8:00 AM - 4:00 PM  Fee: Free  Accessibility: Blind accommodation, Deaf or hard of hearing  Transportation Options: Free transportation    Drop-In Services      [facility] [place] [facility] - Warming or cooling [place] - [facility]  [address] [place] [address] (Distance: 22.1 miles)  Language: English  Fee: Free      [facility] - Warming or cooling [place] - [facility]  [address] [place] [address] (Distance: 15.5 miles)  Language: English  Fee: Free      [employer] [facility]  Website: http://www.laundrylove.org               IMPORTANT NUMBERS & WEBSITES        Emergency  Services  911  .   [facility]  211 http://211unitedway.org  .   Poison Control  (629) 855-1835 http://[place]poison.org http://[place]is[place]poison.org  .     Suicide and Crisis Lifeline  988 http://98lifeline.org  .   [employer] [place] Child Abuse Hotline  448.826.6010 http://Childhelphotline.org   .   National Sexual Assault Hotline  (809) 897-3118 (HOPE) http://Ullinkn.org   .     [place] Runaway Safeline  (463) 356-1205 (RUNAWAY) http://seedtag.Strava  .   Pregnancy & Postpartum Support  Call/text 235-791-5157  [place]: http://ppsupportmn.org  [place]: http://Dogecoin.com/wi  .   Substance Abuse National Helpline ([facility])  906-721-HELP (6712) http://Findtreatment.gov   .                DISCLAIMER: [employer] does not endorse any service providers mentioned in this resource list. [employer] does not guarantee that the services mentioned in this resource list will be available to you or will improve your health or wellness.    [facility]

## 2024-03-28 NOTE — PATIENT INSTRUCTIONS
Preventive Care Advice   This is general advice given by our system to help you stay healthy. However, your care team may have specific advice just for you. Please talk to your care team about your preventive care needs.  Nutrition  Eat 5 or more servings of fruits and vegetables each day.  Try wheat bread, brown rice and whole grain pasta (instead of white bread, rice, and pasta).  Get enough calcium and vitamin D. Check the label on foods and aim for 100% of the RDA (recommended daily allowance).  Lifestyle  Exercise at least 150 minutes each week   (30 minutes a day, 5 days a week).  Do muscle strengthening activities 2 days a week. These help control your weight and prevent disease.  No smoking.  Wear sunscreen to prevent skin cancer.  Have a dental exam and cleaning every 6 months.  Yearly exams  See your health care team every year to talk about:  Any changes in your health.  Any medicines your care team has prescribed.  Preventive care, family planning, and ways to prevent chronic diseases.  Shots (vaccines)   HPV shots (up to age 26), if you've never had them before.  Hepatitis B shots (up to age 59), if you've never had them before.  COVID-19 shot: Get this shot when it's due.  Flu shot: Get a flu shot every year.  Tetanus shot: Get a tetanus shot every 10 years.  Pneumococcal, hepatitis A, and RSV shots: Ask your care team if you need these based on your risk.  Shingles shot (for age 50 and up).  General health tests  Diabetes screening:  Starting at age 35, Get screened for diabetes at least every 3 years.  If you are younger than age 35, ask your care team if you should be screened for diabetes.  Cholesterol test: At age 39, start having a cholesterol test every 5 years, or more often if advised.  Bone density scan (DEXA): At age 50, ask your care team if you should have this scan for osteoporosis (brittle bones).  Hepatitis C: Get tested at least once in your life.  STIs (sexually transmitted  infections)  Before age 24: Ask your care team if you should be screened for STIs.  After age 24: Get screened for STIs if you're at risk. You are at risk for STIs (including HIV) if:  You are sexually active with more than one person.  You don't use condoms every time.  You or a partner was diagnosed with a sexually transmitted infection.  If you are at risk for HIV, ask about PrEP medicine to prevent HIV.  Get tested for HIV at least once in your life, whether you are at risk for HIV or not.  Cancer screening tests  Cervical cancer screening: If you have a cervix, begin getting regular cervical cancer screening tests at age 21. Most people who have regular screenings with normal results can stop after age 65. Talk about this with your provider.  Breast cancer scan (mammogram): If you've ever had breasts, begin having regular mammograms starting at age 40. This is a scan to check for breast cancer.  Colon cancer screening: It is important to start screening for colon cancer at age 45.  Have a colonoscopy test every 10 years (or more often if you're at risk) Or, ask your provider about stool tests like a FIT test every year or Cologuard test every 3 years.  To learn more about your testing options, visit: https://www.[employer]/463874.pdf.  For help making a decision, visit: https://bit.ly/mk86879.  Prostate cancer screening test: If you have a prostate and are age 55 to 69, ask your provider if you would benefit from a yearly prostate cancer screening test.  Lung cancer screening: If you are a current or former smoker age 50 to 80, ask your care team if ongoing lung cancer screenings are right for you.  For informational purposes only. Not to replace the advice of your health care provider. Copyright   2023 [employer]. All rights reserved. Clinically reviewed by the [facility] Transitions Program. [employer] [id number] - REV 01/24.

## 2024-05-18 DIAGNOSIS — E10.9 TYPE 1 DIABETES MELLITUS WITHOUT COMPLICATION (H): ICD-10-CM

## 2024-05-20 DIAGNOSIS — E10.9 TYPE 1 DIABETES MELLITUS WITHOUT COMPLICATION (H): ICD-10-CM

## 2024-05-20 RX ORDER — INSULIN GLARGINE 100 [IU]/ML
INJECTION, SOLUTION SUBCUTANEOUS
Qty: 15 ML | OUTPATIENT
Start: 2024-05-20

## 2024-05-20 RX ORDER — INSULIN GLARGINE 100 [IU]/ML
INJECTION, SOLUTION SUBCUTANEOUS
Qty: 15 ML | Refills: 0 | Status: SHIPPED | OUTPATIENT
Start: 2024-05-20

## 2024-05-28 DIAGNOSIS — E03.9 HYPOTHYROIDISM, UNSPECIFIED TYPE: ICD-10-CM

## 2024-05-28 RX ORDER — LEVOTHYROXINE SODIUM 75 UG/1
75 TABLET ORAL DAILY
Qty: 30 TABLET | Refills: 0 | OUTPATIENT
Start: 2024-05-28

## 2024-05-28 RX ORDER — LEVOTHYROXINE SODIUM 75 UG/1
75 TABLET ORAL DAILY
Qty: 90 TABLET | OUTPATIENT
Start: 2024-05-28

## 2024-05-28 RX ORDER — LEVOTHYROXINE SODIUM 75 UG/1
75 TABLET ORAL DAILY
Qty: 30 TABLET | Refills: 0 | Status: SHIPPED | OUTPATIENT
Start: 2024-05-28 | End: 2024-06-25

## 2024-06-25 DIAGNOSIS — E03.9 HYPOTHYROIDISM, UNSPECIFIED TYPE: ICD-10-CM

## 2024-06-25 DIAGNOSIS — E03.9 HYPOTHYROIDISM, UNSPECIFIED TYPE: Primary | ICD-10-CM

## 2024-06-25 RX ORDER — LEVOTHYROXINE SODIUM 75 UG/1
75 TABLET ORAL DAILY
Qty: 30 TABLET | Refills: 0 | Status: SHIPPED | OUTPATIENT
Start: 2024-06-25 | End: 2024-08-02

## 2024-06-25 RX ORDER — LEVOTHYROXINE SODIUM 75 UG/1
75 TABLET ORAL DAILY
Qty: 90 TABLET | OUTPATIENT
Start: 2024-06-25

## 2024-08-02 DIAGNOSIS — E03.9 HYPOTHYROIDISM, UNSPECIFIED TYPE: ICD-10-CM

## 2024-08-02 RX ORDER — LEVOTHYROXINE SODIUM 75 UG/1
75 TABLET ORAL DAILY
Qty: 90 TABLET | OUTPATIENT
Start: 2024-08-02

## 2024-08-02 RX ORDER — LEVOTHYROXINE SODIUM 75 UG/1
75 TABLET ORAL DAILY
Qty: 30 TABLET | Refills: 0 | Status: SHIPPED | OUTPATIENT
Start: 2024-08-02

## 2024-08-02 NOTE — LETTER
August 6, 2024      Hung Fernandes  [address] [address]        Dear PeeDena,     Your provider, Rafael Arredondo, has filled your prescription of levothyroxine (SYNTHROID/LEVOTHROID) 75 MCG tablet and has sent it to the [facility].  You are also due for a follow up visit prior to refills being provided. Please call us at [phone] or schedule on MyMusic.      Sincerely,   Your [facility] Team

## 2024-11-03 ENCOUNTER — HEALTH MAINTENANCE LETTER (OUTPATIENT)
Age: 44
End: 2024-11-03

## 2025-05-10 ENCOUNTER — HEALTH MAINTENANCE LETTER (OUTPATIENT)
Age: 45
End: 2025-05-10